# Patient Record
Sex: FEMALE | Race: OTHER | Employment: FULL TIME | URBAN - METROPOLITAN AREA
[De-identification: names, ages, dates, MRNs, and addresses within clinical notes are randomized per-mention and may not be internally consistent; named-entity substitution may affect disease eponyms.]

---

## 2020-01-28 ENCOUNTER — OFFICE VISIT (OUTPATIENT)
Dept: OBGYN CLINIC | Facility: CLINIC | Age: 35
End: 2020-01-28
Payer: COMMERCIAL

## 2020-01-28 ENCOUNTER — APPOINTMENT (OUTPATIENT)
Dept: LAB | Facility: REFERENCE LAB | Age: 35
End: 2020-01-28
Attending: OBSTETRICS & GYNECOLOGY
Payer: COMMERCIAL

## 2020-01-28 VITALS
HEIGHT: 60 IN | DIASTOLIC BLOOD PRESSURE: 52 MMHG | WEIGHT: 117 LBS | SYSTOLIC BLOOD PRESSURE: 100 MMHG | BODY MASS INDEX: 22.97 KG/M2

## 2020-01-28 DIAGNOSIS — N93.9 ABNORMAL UTERINE BLEEDING: Primary | ICD-10-CM

## 2020-01-28 DIAGNOSIS — N93.9 ABNORMAL UTERINE BLEEDING: ICD-10-CM

## 2020-01-28 LAB
EST. AVERAGE GLUCOSE BLD GHB EST-MCNC: 105 MG/DL (ref 68–126)
FSH SERPL-ACNC: 6.6 MIU/ML
HBA1C MFR BLD HPLC: 5.3 % (ref ?–5.7)
LH SERPL-ACNC: 11.5 MIU/ML
PROLACTIN SERPL-MCNC: 18.5 NG/ML
TSI SER-ACNC: 1.86 MIU/ML (ref 0.36–3.74)

## 2020-01-28 PROCEDURE — 83001 ASSAY OF GONADOTROPIN (FSH): CPT

## 2020-01-28 PROCEDURE — 84146 ASSAY OF PROLACTIN: CPT | Performed by: OBSTETRICS & GYNECOLOGY

## 2020-01-28 PROCEDURE — 84443 ASSAY THYROID STIM HORMONE: CPT

## 2020-01-28 PROCEDURE — 36415 COLL VENOUS BLD VENIPUNCTURE: CPT

## 2020-01-28 PROCEDURE — 83036 HEMOGLOBIN GLYCOSYLATED A1C: CPT

## 2020-01-28 PROCEDURE — 83002 ASSAY OF GONADOTROPIN (LH): CPT

## 2020-01-28 PROCEDURE — 99204 OFFICE O/P NEW MOD 45 MIN: CPT | Performed by: OBSTETRICS & GYNECOLOGY

## 2020-01-28 RX ORDER — MELATONIN
325
COMMUNITY

## 2020-01-28 RX ORDER — TRANEXAMIC ACID 650 1/1
1300 TABLET ORAL EVERY 8 HOURS PRN
Qty: 50 TABLET | Refills: 2 | Status: SHIPPED | OUTPATIENT
Start: 2020-01-28 | End: 2020-02-02

## 2020-01-28 NOTE — PROGRESS NOTES
GYN H&P     2020  12:35 PM    CC: Patient is here for 3 w h/o vaginal bleeding. Patient brought copy of her records from PCP.      HPI: Patient is a 27year old  for 1 W h/o vaginal bleeding which was heavy in the 2nd week, but currently moderat Multiple Live Births           2      # Outcome Date GA Lbr Hira/2nd Weight Sex Delivery Anes PTL Lv   2 Term 2014    M Caesarean   EDELMIRA      Birth Comments: Hypothyroidism   1 Term 2011    M NORMAL SPONT   EDELMIRA      Birth Comments: Hypothyrodism       GYN hx 01/07/2020   Breastfeeding No   BMI 22.85 kg/m²       Exam:   GENERAL: well developed, well nourished, in no apparent distress  SKIN: no rashes, no suspicious lesions  HEENT: normal  NECK: supple; no thyroidmegaly, no adenopathy  BREASTS: symmetrical, nont

## 2020-03-03 ENCOUNTER — OFFICE VISIT (OUTPATIENT)
Dept: OBGYN CLINIC | Facility: CLINIC | Age: 35
End: 2020-03-03
Payer: COMMERCIAL

## 2020-03-03 VITALS — BODY MASS INDEX: 23 KG/M2 | DIASTOLIC BLOOD PRESSURE: 60 MMHG | SYSTOLIC BLOOD PRESSURE: 100 MMHG | HEIGHT: 60 IN

## 2020-03-03 DIAGNOSIS — N92.0 MENORRHAGIA WITH REGULAR CYCLE: Primary | ICD-10-CM

## 2020-03-03 PROCEDURE — 99213 OFFICE O/P EST LOW 20 MIN: CPT | Performed by: OBSTETRICS & GYNECOLOGY

## 2020-03-03 NOTE — PROGRESS NOTES
CC: Patient is here for abnormal uterine bleeding. HPI: Patient is a 27year old  for 3 W of vaginal bleeding.  She had 3 W of vaginal bleeding in 2020 which stopped on its own, and then again 2020 she has been bleeding since which is gett insecurity:        Worry: Not on file        Inability: Not on file      Transportation needs:        Medical: Not on file        Non-medical: Not on file    Tobacco Use      Smoking status: Never Smoker      Smokeless tobacco: Never Used    Substance and No palpable hernias    Component      Latest Ref Rng & Units 1/28/2020   HEMOGLOBIN A1c      <5.7 % 5.3   ESTIMATED AVERAGE GLUCOSE      68 - 126 mg/dL 105   Prolactin      ng/mL 18.5   FSH      mIU/mL 6.6   LH      mIU/mL 11.5   TSH      0.358 - 3.740 mIU

## 2020-03-10 RX ORDER — TRANEXAMIC ACID 650 1/1
650 TABLET ORAL 3 TIMES DAILY
COMMUNITY
End: 2020-06-04

## 2020-03-13 ENCOUNTER — ANESTHESIA EVENT (OUTPATIENT)
Dept: SURGERY | Facility: HOSPITAL | Age: 35
End: 2020-03-13
Payer: COMMERCIAL

## 2020-03-13 ENCOUNTER — ANESTHESIA (OUTPATIENT)
Dept: SURGERY | Facility: HOSPITAL | Age: 35
End: 2020-03-13
Payer: COMMERCIAL

## 2020-03-13 ENCOUNTER — HOSPITAL ENCOUNTER (OUTPATIENT)
Facility: HOSPITAL | Age: 35
Setting detail: HOSPITAL OUTPATIENT SURGERY
Discharge: HOME | End: 2020-03-13
Attending: OBSTETRICS & GYNECOLOGY | Admitting: OBSTETRICS & GYNECOLOGY
Payer: COMMERCIAL

## 2020-03-13 VITALS
DIASTOLIC BLOOD PRESSURE: 64 MMHG | HEIGHT: 60 IN | SYSTOLIC BLOOD PRESSURE: 115 MMHG | TEMPERATURE: 98 F | RESPIRATION RATE: 14 BRPM | BODY MASS INDEX: 22.58 KG/M2 | OXYGEN SATURATION: 100 % | HEART RATE: 71 BPM | WEIGHT: 115 LBS

## 2020-03-13 LAB — B-HCG UR QL: NEGATIVE

## 2020-03-13 PROCEDURE — 0UB98ZX EXCISION OF UTERUS, VIA NATURAL OR ARTIFICIAL OPENING ENDOSCOPIC, DIAGNOSTIC: ICD-10-PCS | Performed by: OBSTETRICS & GYNECOLOGY

## 2020-03-13 PROCEDURE — 58558 HYSTEROSCOPY BIOPSY: CPT | Performed by: OBSTETRICS & GYNECOLOGY

## 2020-03-13 RX ORDER — HYDROCODONE BITARTRATE AND ACETAMINOPHEN 5; 325 MG/1; MG/1
2 TABLET ORAL AS NEEDED
Status: DISCONTINUED | OUTPATIENT
Start: 2020-03-13 | End: 2020-03-13

## 2020-03-13 RX ORDER — MORPHINE SULFATE 10 MG/ML
6 INJECTION, SOLUTION INTRAMUSCULAR; INTRAVENOUS EVERY 10 MIN PRN
Status: DISCONTINUED | OUTPATIENT
Start: 2020-03-13 | End: 2020-03-13

## 2020-03-13 RX ORDER — PROCHLORPERAZINE EDISYLATE 5 MG/ML
5 INJECTION INTRAMUSCULAR; INTRAVENOUS ONCE AS NEEDED
Status: DISCONTINUED | OUTPATIENT
Start: 2020-03-13 | End: 2020-03-13

## 2020-03-13 RX ORDER — ONDANSETRON 2 MG/ML
4 INJECTION INTRAMUSCULAR; INTRAVENOUS ONCE AS NEEDED
Status: DISCONTINUED | OUTPATIENT
Start: 2020-03-13 | End: 2020-03-13

## 2020-03-13 RX ORDER — MULTIVITAMIN
1 TABLET ORAL
COMMUNITY

## 2020-03-13 RX ORDER — DEXAMETHASONE SODIUM PHOSPHATE 4 MG/ML
VIAL (ML) INJECTION AS NEEDED
Status: DISCONTINUED | OUTPATIENT
Start: 2020-03-13 | End: 2020-03-13 | Stop reason: SURG

## 2020-03-13 RX ORDER — ONDANSETRON 4 MG/1
4 TABLET, FILM COATED ORAL EVERY 8 HOURS PRN
Status: CANCELLED | OUTPATIENT
Start: 2020-03-13

## 2020-03-13 RX ORDER — EPHEDRINE SULFATE 50 MG/ML
INJECTION, SOLUTION INTRAVENOUS AS NEEDED
Status: DISCONTINUED | OUTPATIENT
Start: 2020-03-13 | End: 2020-03-13 | Stop reason: SURG

## 2020-03-13 RX ORDER — IBUPROFEN 600 MG/1
600 TABLET ORAL EVERY 6 HOURS PRN
Qty: 30 TABLET | Refills: 0 | Status: SHIPPED | OUTPATIENT
Start: 2020-03-13 | End: 2020-06-04

## 2020-03-13 RX ORDER — ACETAMINOPHEN 500 MG
1000 TABLET ORAL ONCE
Status: COMPLETED | OUTPATIENT
Start: 2020-03-13 | End: 2020-03-13

## 2020-03-13 RX ORDER — MIDAZOLAM HYDROCHLORIDE 1 MG/ML
INJECTION INTRAMUSCULAR; INTRAVENOUS AS NEEDED
Status: DISCONTINUED | OUTPATIENT
Start: 2020-03-13 | End: 2020-03-13 | Stop reason: SURG

## 2020-03-13 RX ORDER — MORPHINE SULFATE 4 MG/ML
2 INJECTION, SOLUTION INTRAMUSCULAR; INTRAVENOUS EVERY 10 MIN PRN
Status: DISCONTINUED | OUTPATIENT
Start: 2020-03-13 | End: 2020-03-13

## 2020-03-13 RX ORDER — ONDANSETRON 2 MG/ML
4 INJECTION INTRAMUSCULAR; INTRAVENOUS EVERY 8 HOURS PRN
Status: CANCELLED | OUTPATIENT
Start: 2020-03-13

## 2020-03-13 RX ORDER — MORPHINE SULFATE 4 MG/ML
4 INJECTION, SOLUTION INTRAMUSCULAR; INTRAVENOUS EVERY 10 MIN PRN
Status: DISCONTINUED | OUTPATIENT
Start: 2020-03-13 | End: 2020-03-13

## 2020-03-13 RX ORDER — SODIUM CHLORIDE, SODIUM LACTATE, POTASSIUM CHLORIDE, CALCIUM CHLORIDE 600; 310; 30; 20 MG/100ML; MG/100ML; MG/100ML; MG/100ML
INJECTION, SOLUTION INTRAVENOUS CONTINUOUS
Status: DISCONTINUED | OUTPATIENT
Start: 2020-03-13 | End: 2020-03-13

## 2020-03-13 RX ORDER — HALOPERIDOL 5 MG/ML
0.25 INJECTION INTRAMUSCULAR ONCE AS NEEDED
Status: DISCONTINUED | OUTPATIENT
Start: 2020-03-13 | End: 2020-03-13

## 2020-03-13 RX ORDER — HYDROMORPHONE HYDROCHLORIDE 1 MG/ML
0.4 INJECTION, SOLUTION INTRAMUSCULAR; INTRAVENOUS; SUBCUTANEOUS EVERY 5 MIN PRN
Status: DISCONTINUED | OUTPATIENT
Start: 2020-03-13 | End: 2020-03-13

## 2020-03-13 RX ORDER — HYDROCODONE BITARTRATE AND ACETAMINOPHEN 5; 325 MG/1; MG/1
1 TABLET ORAL AS NEEDED
Status: DISCONTINUED | OUTPATIENT
Start: 2020-03-13 | End: 2020-03-13

## 2020-03-13 RX ORDER — IBUPROFEN 600 MG/1
600 TABLET ORAL EVERY 6 HOURS PRN
Status: DISCONTINUED | OUTPATIENT
Start: 2020-03-13 | End: 2020-03-13

## 2020-03-13 RX ORDER — FAMOTIDINE 20 MG/1
20 TABLET ORAL ONCE
Status: DISCONTINUED | OUTPATIENT
Start: 2020-03-13 | End: 2020-03-13 | Stop reason: HOSPADM

## 2020-03-13 RX ORDER — HYDROMORPHONE HYDROCHLORIDE 1 MG/ML
0.2 INJECTION, SOLUTION INTRAMUSCULAR; INTRAVENOUS; SUBCUTANEOUS EVERY 5 MIN PRN
Status: DISCONTINUED | OUTPATIENT
Start: 2020-03-13 | End: 2020-03-13

## 2020-03-13 RX ORDER — NALOXONE HYDROCHLORIDE 0.4 MG/ML
80 INJECTION, SOLUTION INTRAMUSCULAR; INTRAVENOUS; SUBCUTANEOUS AS NEEDED
Status: DISCONTINUED | OUTPATIENT
Start: 2020-03-13 | End: 2020-03-13

## 2020-03-13 RX ORDER — ONDANSETRON 2 MG/ML
INJECTION INTRAMUSCULAR; INTRAVENOUS AS NEEDED
Status: DISCONTINUED | OUTPATIENT
Start: 2020-03-13 | End: 2020-03-13 | Stop reason: SURG

## 2020-03-13 RX ORDER — METOCLOPRAMIDE 10 MG/1
10 TABLET ORAL ONCE
Status: DISCONTINUED | OUTPATIENT
Start: 2020-03-13 | End: 2020-03-13 | Stop reason: HOSPADM

## 2020-03-13 RX ORDER — HYDROMORPHONE HYDROCHLORIDE 1 MG/ML
0.6 INJECTION, SOLUTION INTRAMUSCULAR; INTRAVENOUS; SUBCUTANEOUS EVERY 5 MIN PRN
Status: DISCONTINUED | OUTPATIENT
Start: 2020-03-13 | End: 2020-03-13

## 2020-03-13 RX ADMIN — DEXAMETHASONE SODIUM PHOSPHATE 4 MG: 4 MG/ML VIAL (ML) INJECTION at 09:58:00

## 2020-03-13 RX ADMIN — MIDAZOLAM HYDROCHLORIDE 2 MG: 1 INJECTION INTRAMUSCULAR; INTRAVENOUS at 09:40:00

## 2020-03-13 RX ADMIN — EPHEDRINE SULFATE 10 MG: 50 INJECTION, SOLUTION INTRAVENOUS at 09:47:00

## 2020-03-13 RX ADMIN — ONDANSETRON 4 MG: 2 INJECTION INTRAMUSCULAR; INTRAVENOUS at 09:58:00

## 2020-03-13 NOTE — ANESTHESIA PREPROCEDURE EVALUATION
Anesthesia PreOp Note    HPI:     Eric Beebe is a 27year old female who presents for preoperative consultation requested by: Gloria Duke MD    Date of Surgery: 3/13/2020    Procedure(s):   HYSTEROSCOPY DILATION AND CURET High Blood Pressure Mother    • Diabetes Mother    • High Blood Pressure Father    • No Known Problems Sister    • No Known Problems Brother    • No Known Problems Maternal Grandmother    • No Known Problems Maternal Grandfather    • No Known Problems Alexander Transfusions: No        Caffeine Concern: Not Asked        Occupational Exposure: Not Asked        Hobby Hazards: Not Asked        Sleep Concern: Not Asked        Stress Concern: Not Asked        Weight Concern: Not Asked        Special Diet: Not Asked

## 2020-03-13 NOTE — ANESTHESIA PROCEDURE NOTES
Airway  Date/Time: 3/13/2020 9:44 AM  Urgency: Elective    Airway not difficult    General Information and Staff    Patient location during procedure: OR  Anesthesiologist: Ewelina Constantino MD  Performed: anesthesiologist     Indications and Patient Conditio

## 2020-03-13 NOTE — ANESTHESIA POSTPROCEDURE EVALUATION
Patient: Ruby Mullins    Procedure Summary     Date:  03/13/20 Room / Location:  [facility] OR  / [facility] OR    Anesthesia Start:  1244 Anesthesia Stop:  0917    Procedure:  HYSTEROSCOPY DILATION AND CURETTAGE (N/A ) Diagnosis:  (menorrh

## 2020-03-13 NOTE — PROGRESS NOTES
CC: Patient is here for abnormal uterine bleeding. HPI: Patient is a 27year old  for 3 W of vaginal bleeding.  She had 3 W of vaginal bleeding in 2020 which stopped on its own, and then again 2020 she has been bleeding since which is gett resource strain: Not on file      Food insecurity:        Worry: Not on file        Inability: Not on file      Transportation needs:        Medical: Not on file        Non-medical: Not on file    Tobacco Use      Smoking status: Never Smoker      [place] nourished, in no apparent distress  ABDOMEN: Soft, non distended; non tender, no masses. Liver and spleen non-tender, no enlargement.  No palpable hernias    Component      Latest Ref Rng & Units 1/28/2020   HEMOGLOBIN A1c      <5.7 % 5.3   ESTIMATED AVERA

## 2020-03-13 NOTE — BRIEF OP NOTE
Pre-Operative Diagnosis: menorrhagia     Post-Operative Diagnosis: menorrhagia      Procedure Performed:   Procedure(s):  hysteroscopy, with hysteroscopic sampling of endometrium    Surgeon(s) and Role:     * Chorzempa-Schainis, Elvan Sever, MD - Primary    Ass

## 2020-03-14 NOTE — OPERATIVE REPORT
[facility]    PATIENT'S NAME: Shree Sven CAT   ATTENDING PHYSICIAN: Edith Aceves MD   OPERATING PHYSICIAN: Edith Aceves MD   PATIENT ACCOUNT#:   [id number]    LOCATION:  [address] [address] [facility]

## 2020-05-08 ENCOUNTER — TELEPHONE (OUTPATIENT)
Dept: OBGYN CLINIC | Facility: CLINIC | Age: 35
End: 2020-05-08

## 2020-05-08 NOTE — TELEPHONE ENCOUNTER
Per LC, pt to keep eye on bleeding and take Lysteda PRN as prescribed; if bleeding becomes heavy to call back and pt needs to be seen. LC's msg relayed to pt. Pt verbalized understanding and agrees with POC.

## 2020-05-08 NOTE — TELEPHONE ENCOUNTER
Pt states menstrual flow is the same as prior to Hysteroscopy and D&C on 3/13/2020. Pt states she has medium flow, not saturating pads. Pt states she has blood clots. Pt denies: dizziness, lightheadedness, any other sx.  Pt states she takes Lysteda 1300mg a

## 2020-05-08 NOTE — TELEPHONE ENCOUNTER
Patient states been bleeding for 3weeks and will like to discuss states had [facility]  March 13th but bleeding is the same

## 2020-06-04 ENCOUNTER — OFFICE VISIT (OUTPATIENT)
Dept: OBGYN CLINIC | Facility: CLINIC | Age: 35
End: 2020-06-04
Payer: COMMERCIAL

## 2020-06-04 VITALS
HEIGHT: 60 IN | DIASTOLIC BLOOD PRESSURE: 66 MMHG | WEIGHT: 120 LBS | BODY MASS INDEX: 23.56 KG/M2 | SYSTOLIC BLOOD PRESSURE: 100 MMHG

## 2020-06-04 DIAGNOSIS — N93.9 ABNORMAL UTERINE BLEEDING: Primary | ICD-10-CM

## 2020-06-04 PROCEDURE — 99212 OFFICE O/P EST SF 10 MIN: CPT | Performed by: OBSTETRICS & GYNECOLOGY

## 2020-06-04 NOTE — PROGRESS NOTES
Patient had 3/2020 d&c [facility] for abnormal bleeding and + polyp. Since then she has continued to have menses q 2 W with bleeding from heavy to light bleeding.  Plan to start lo ovral dw pt use and SE.

## (undated) DEVICE — SOL  .9 3000ML

## (undated) DEVICE — MYOSURE SINGLE USE SEAL SET

## (undated) DEVICE — MEDI-VAC NON-CONDUCTIVE SUCTION TUBING: Brand: CARDINAL HEALTH

## (undated) DEVICE — ENCORE® LATEX ACCLAIM SIZE 6.5, STERILE LATEX POWDER-FREE SURGICAL GLOVE: Brand: ENCORE

## (undated) DEVICE — MYOSURE LITE BLADE

## (undated) DEVICE — WOLF INFLOW HYSTER

## (undated) DEVICE — ENCORE® LATEX MICRO SIZE 7, STERILE LATEX POWDER-FREE SURGICAL GLOVE: Brand: ENCORE

## (undated) DEVICE — HYSTEROSCOPY: Brand: MEDLINE INDUSTRIES, INC.

## (undated) DEVICE — SET TUBI Y FL CNTRL INFL/OTFL